# Patient Record
Sex: MALE | Race: WHITE | Employment: FULL TIME | URBAN - METROPOLITAN AREA
[De-identification: names, ages, dates, MRNs, and addresses within clinical notes are randomized per-mention and may not be internally consistent; named-entity substitution may affect disease eponyms.]

---

## 2020-07-06 ENCOUNTER — VIRTUAL VISIT (OUTPATIENT)
Dept: FAMILY MEDICINE | Facility: CLINIC | Age: 25
End: 2020-07-06
Payer: COMMERCIAL

## 2020-07-06 DIAGNOSIS — F41.1 GENERALIZED ANXIETY DISORDER: Primary | ICD-10-CM

## 2020-07-06 PROCEDURE — 99204 OFFICE O/P NEW MOD 45 MIN: CPT | Mod: 95 | Performed by: PHYSICIAN ASSISTANT

## 2020-07-06 RX ORDER — BUPROPION HYDROCHLORIDE 150 MG/1
150 TABLET ORAL EVERY MORNING
Qty: 60 TABLET | Refills: 1 | Status: SHIPPED | OUTPATIENT
Start: 2020-07-06 | End: 2020-07-24

## 2020-07-06 NOTE — PATIENT INSTRUCTIONS
Patient Education     Understanding Generalized Anxiety Disorder (KYUNG)    Anxiety can fill you with worry and fear. Sometimes anxiety is healthy. It alerts you to a potential threat and gets you to respond and take action. But for some people, anxiety gets so bad it causes problems in daily life. If you find yourself in a constant state of anxiety, you may have an anxiety disorder called generalized anxiety disorder (KYUNG). Speak with your healthcare provider or mental health professional to learn more. He or she can help.   What is generalized anxiety disorder?  KYUNG means that you are worrying constantly and can t control the worrying. Healthcare providers diagnose KYUNG when your worrying happens on most days and for at least 6 months.  With KYUNG, you might worry about money, your family and friends, work, or the world in general. You might not even be sure what you're anxious about. But whatever it is, you have an intense fear that the worst will happen. These feelings never really go away. In people age 65 and older, KYUNG is one of the most commonly diagnosed anxiety disorders.  Many times it occurs with depression. This constant worry affects your quality of life and makes it hard to function. KYUNG can cause physical symptoms, too.  What are common symptoms of generalized anxiety disorder?  People with KUYNG often think they have a physical illness. The disorder can cause symptoms, such as:    Excessive worry that interferes with daily activities and lasts for at least 6 months    Muscle tension, especially in the neck and shoulders    Nausea and stomach problems    Frequent headaches    Feeling lightheaded    Restlessness, trouble sleeping    Feeling irritable and on edge all the time  How can generalized anxiety disorder be treated?  KYUNG can be treated with medicine or therapy (also called counseling), or both. Medicine helps to reduce symptoms, so you can continue with your daily routine. Therapy helps you  understand the cause of your anxiety and learn how to manage it. Both forms of treatment help you deal with problems that anxiety causes in your life. This helps you to be healthier and happier.  Date Last Reviewed: 5/1/2017 2000-2019 [email or web address]. [address] [place] [address], [place] [address]. All rights reserved. This information is not intended as a substitute for professional medical care. Always follow your healthcare professional's instructions.

## 2020-07-06 NOTE — PROGRESS NOTES
"Kuldeep Deluca is a 24 year old male who is being evaluated via a billable video visit.      The patient has been notified of following:     \"This video visit will be conducted via a call between you and your physician/provider. We have found that certain health care needs can be provided without the need for an in-person physical exam.  This service lets us provide the care you need with a video conversation.  If a prescription is necessary we can send it directly to your pharmacy.  If lab work is needed we can place an order for that and you can then stop by our lab to have the test done at a later time.    Video visits are billed at different rates depending on your insurance coverage.  Please reach out to your insurance provider with any questions.    If during the course of the call the physician/provider feels a video visit is not appropriate, you will not be charged for this service.\"    Patient has given verbal consent for Video visit? Yes  How would you like to obtain your AVS? Mail a copy  Patient would like the video invitation sent by: Text to cell phone: [phone]  Will anyone else be joining your video visit? No  Subjective     Kuldeep Deluca is a 24 year old male who presents today via video visit for the following health issues:    HPI  Abnormal Mood Symptoms    Duration: 3-4 years     Description:  Depression: no  Anxiety: YES  Panic attacks: no      Accompanying signs and symptoms: see PHQ-9 and KYUNG scores    History (similar episodes/previous evaluation): None    Precipitating or alleviating factors: None    Therapies tried and outcome: none    Attention issues     Duration: 3-4 years     Description (location/character/radiation): Patient states that it was pointed out by a physiatrist he saw around the age of 16. Says that he didn't really start to notice it was a issue till 3-4 years ago      Intensity:  moderate    Accompanying signs and symptoms: Trouble focusing / completing tasks "     History (similar episodes/previous evaluation): None    Precipitating or alleviating factors: None    Therapies tried and outcome: None     Video Start Time: 8:06 AM    Patient Active Problem List   Diagnosis     Mild intermittent asthma     Obesity     Past Surgical History:   Procedure Laterality Date     HC CREATE EARDRUM OPENING,GEN ANESTH  1995    P.E. Tubes     HC REMOVE TONSILS/ADENOIDS,<11 Y/O  1998    T & A <10y.o.       Social History     Tobacco Use     Smoking status: Never Smoker     Smokeless tobacco: Never Used     Tobacco comment: no exposure   Substance Use Topics     Alcohol use: No     Family History   Problem Relation Age of Onset     Eye Disorder Mother      Genitourinary Problems Mother         kidney stones     Respiratory Mother         asthma     Diabetes Paternal Grandfather      Lipids Paternal Grandfather      Alcohol/Drug Paternal Grandfather         alcohol     Obesity Paternal Grandfather      Lipids Maternal Grandmother      Obesity Maternal Grandmother          Current Outpatient Medications   Medication Sig Dispense Refill     buPROPion (WELLBUTRIN XL) 150 MG 24 hr tablet Take 1 tablet (150 mg) by mouth every morning 60 tablet 1     IBUPROFEN 200 MG OR TABS 600 MG ORALLY EVERY 6 HOURS WITH FOOD       Allergies   Allergen Reactions     Sulfa Drugs      Reviewed and updated as needed this visit by Provider  Tobacco  Allergies  Meds  Problems  Med Hx  Surg Hx  Fam Hx       Review of Systems   Constitutional, HEENT, cardiovascular, pulmonary, gi and gu systems are negative, except as otherwise noted.      Objective       Physical Exam   GENERAL: Healthy, alert and no distress  EYES: Eyes grossly normal to inspection.  No discharge or erythema, or obvious scleral/conjunctival abnormalities.  RESP: No audible wheeze, cough, or visible cyanosis.  No visible retractions or increased work of breathing.    SKIN: Visible skin clear. No significant rash, abnormal pigmentation or  lesions.  NEURO: Cranial nerves grossly intact.  Mentation and speech appropriate for age.  PSYCH: Mentation appears normal, affect normal/bright, judgement and insight intact, normal speech and appearance well-groomed.    Diagnostic Test Results:  none       Assessment & Plan   (F41.1) Generalized anxiety disorder  (primary encounter diagnosis)  Comment: Pt with ongoing anxiety for many years. Has not been treated for this in the past. Also with inattentive symptoms. Discussed symptoms and they are daily, and somewhat unpredictable. Recommended daily preventative treatment for anxiety first. Once this is controlled better, we can tackle the inattention symptoms. Discussed medication side effects. Patient made the informed decision to start Wellbutrin. He will also go to therapy, referral placed. Follow-up in 3-4 weeks for reevaluation and medication titration.   Plan: buPROPion (WELLBUTRIN XL) 150 MG 24 hr tablet,         MENTAL HEALTH REFERRAL  - Adult; Outpatient         Treatment; Individual/Couples/Family/Group         Therapy/Health Psychology; [facility]: [facility] [phone]; We will         contact you to schedule the appointment or         please call with any questions    Return in about 3 weeks (around 7/27/2020) for Video Visit.    Dakota Molina PA-C  [facility]    Video-Visit Details    Type of service:  Video Visit    Video End Time:8:22 AM    Originating Location (pt. Location): Home    Distant Location (provider location):  [facility]     Platform used for Video Visit: Jovanny Molina PA-C

## 2020-07-24 ENCOUNTER — VIRTUAL VISIT (OUTPATIENT)
Dept: FAMILY MEDICINE | Facility: CLINIC | Age: 25
End: 2020-07-24
Payer: COMMERCIAL

## 2020-07-24 ENCOUNTER — VIRTUAL VISIT (OUTPATIENT)
Dept: BEHAVIORAL HEALTH | Facility: CLINIC | Age: 25
End: 2020-07-24
Attending: PHYSICIAN ASSISTANT

## 2020-07-24 DIAGNOSIS — F41.1 GENERALIZED ANXIETY DISORDER: Primary | ICD-10-CM

## 2020-07-24 DIAGNOSIS — F41.9 ANXIETY: Primary | ICD-10-CM

## 2020-07-24 PROCEDURE — 90834 PSYTX W PT 45 MINUTES: CPT | Mod: 95 | Performed by: MARRIAGE & FAMILY THERAPIST

## 2020-07-24 PROCEDURE — 99213 OFFICE O/P EST LOW 20 MIN: CPT | Mod: 95 | Performed by: PHYSICIAN ASSISTANT

## 2020-07-24 RX ORDER — BUPROPION HYDROCHLORIDE 150 MG/1
300 TABLET ORAL EVERY MORNING
Qty: 60 TABLET | Refills: 1 | Status: SHIPPED | OUTPATIENT
Start: 2020-07-24

## 2020-07-24 ASSESSMENT — COLUMBIA-SUICIDE SEVERITY RATING SCALE - C-SSRS
4. HAVE YOU HAD THESE THOUGHTS AND HAD SOME INTENTION OF ACTING ON THEM?: NO
ATTEMPT PAST THREE MONTHS: NO
4. HAVE YOU HAD THESE THOUGHTS AND HAD SOME INTENTION OF ACTING ON THEM?: NO
TOTAL  NUMBER OF ABORTED OR SELF INTERRUPTED ATTEMPTS PAST 3 MONTHS: NO
1. IN THE PAST MONTH, HAVE YOU WISHED YOU WERE DEAD OR WISHED YOU COULD GO TO SLEEP AND NOT WAKE UP?: NO
5. HAVE YOU STARTED TO WORK OUT OR WORKED OUT THE DETAILS OF HOW TO KILL YOURSELF? DO YOU INTEND TO CARRY OUT THIS PLAN?: NO
TOTAL  NUMBER OF ABORTED OR SELF INTERRUPTED ATTEMPTS PAST LIFETIME: NO
2. HAVE YOU ACTUALLY HAD ANY THOUGHTS OF KILLING YOURSELF?: NO
TOTAL  NUMBER OF INTERRUPTED ATTEMPTS LIFETIME: NO
TOTAL  NUMBER OF INTERRUPTED ATTEMPTS PAST 3 MONTHS: NO
2. HAVE YOU ACTUALLY HAD ANY THOUGHTS OF KILLING YOURSELF LIFETIME?: NO
ATTEMPT LIFETIME: NO
6. HAVE YOU EVER DONE ANYTHING, STARTED TO DO ANYTHING, OR PREPARED TO DO ANYTHING TO END YOUR LIFE?: NO
6. HAVE YOU EVER DONE ANYTHING, STARTED TO DO ANYTHING, OR PREPARED TO DO ANYTHING TO END YOUR LIFE?: NO
3. HAVE YOU BEEN THINKING ABOUT HOW YOU MIGHT KILL YOURSELF?: NO
5. HAVE YOU STARTED TO WORK OUT OR WORKED OUT THE DETAILS OF HOW TO KILL YOURSELF? DO YOU INTEND TO CARRY OUT THIS PLAN?: NO
1. IN THE PAST MONTH, HAVE YOU WISHED YOU WERE DEAD OR WISHED YOU COULD GO TO SLEEP AND NOT WAKE UP?: NO

## 2020-07-24 ASSESSMENT — ANXIETY QUESTIONNAIRES
1. FEELING NERVOUS, ANXIOUS, OR ON EDGE: MORE THAN HALF THE DAYS
5. BEING SO RESTLESS THAT IT IS HARD TO SIT STILL: NEARLY EVERY DAY
6. BECOMING EASILY ANNOYED OR IRRITABLE: SEVERAL DAYS
7. FEELING AFRAID AS IF SOMETHING AWFUL MIGHT HAPPEN: NOT AT ALL
2. NOT BEING ABLE TO STOP OR CONTROL WORRYING: SEVERAL DAYS
GAD7 TOTAL SCORE: 10
3. WORRYING TOO MUCH ABOUT DIFFERENT THINGS: MORE THAN HALF THE DAYS
IF YOU CHECKED OFF ANY PROBLEMS ON THIS QUESTIONNAIRE, HOW DIFFICULT HAVE THESE PROBLEMS MADE IT FOR YOU TO DO YOUR WORK, TAKE CARE OF THINGS AT HOME, OR GET ALONG WITH OTHER PEOPLE: SOMEWHAT DIFFICULT

## 2020-07-24 ASSESSMENT — PATIENT HEALTH QUESTIONNAIRE - PHQ9
SUM OF ALL RESPONSES TO PHQ QUESTIONS 1-9: 15
5. POOR APPETITE OR OVEREATING: SEVERAL DAYS

## 2020-07-24 NOTE — Clinical Note
Audie Duncan, I recently met with Kuldeep and wanted to let you see my note. He is really wondering about ADHD so I referred him for testing. He plans to meet with me again the same day he has his next follow up with you. Let me know if you have any other questions or concerns.   Thanks,   BETH Dumont, Behavioral Health Clinician

## 2020-07-24 NOTE — PROGRESS NOTES
"Kuldeep Deluca is a 24 year old male who is being evaluated via a billable video visit.      The patient has been notified of following:     \"This video visit will be conducted via a call between you and your physician/provider. We have found that certain health care needs can be provided without the need for an in-person physical exam.  This service lets us provide the care you need with a video conversation.  If a prescription is necessary we can send it directly to your pharmacy.  If lab work is needed we can place an order for that and you can then stop by our lab to have the test done at a later time.    Video visits are billed at different rates depending on your insurance coverage.  Please reach out to your insurance provider with any questions.    If during the course of the call the physician/provider feels a video visit is not appropriate, you will not be charged for this service.\"    Patient has given verbal consent for Video visit? Yes  How would you like to obtain your AVS? MyChart  If you are dropped from the video visit, the video invite should be resent to: Text to cell phone: [phone]  Will anyone else be joining your video visit? No    Subjective   Kuldeep Deluca is a 24 year old male who presents today via video visit for the following health issues:    HPI  Anxiety Follow-Up    How are you doing with your anxiety since your last visit? No change    Are you having other symptoms that might be associated with anxiety? No    Have you had a significant life event? Relationship Concerns     Are you feeling depressed? Yes:  feels lost     Do you have any concerns with your use of alcohol or other drugs? No     Patient denies any s/e at this time.     No SI, HI or self harm.     Social History     Tobacco Use     Smoking status: Never Smoker     Smokeless tobacco: Never Used     Tobacco comment: no exposure   Substance Use Topics     Alcohol use: No     Drug use: No       How many servings of " fruits and vegetables do you eat daily?  0-1    On average, how many sweetened beverages do you drink each day (Examples: soda, juice, sweet tea, etc.  Do NOT count diet or artificially sweetened beverages)?   0    How many days per week do you exercise enough to make your heart beat faster? 3 or less    How many minutes a day do you exercise enough to make your heart beat faster? 20 - 29  How many days per week do you miss taking your medication? 1    What makes it hard for you to take your medications?  remembering to take    Video Start Time: 8:30 AM    Patient Active Problem List   Diagnosis     Mild intermittent asthma     Obesity     Past Surgical History:   Procedure Laterality Date     HC CREATE EARDRUM OPENING,GEN ANESTH  1995    P.E. Tubes     HC REMOVE TONSILS/ADENOIDS,<13 Y/O  1998    T & A <10y.o.       Social History     Tobacco Use     Smoking status: Never Smoker     Smokeless tobacco: Never Used     Tobacco comment: no exposure   Substance Use Topics     Alcohol use: No     Family History   Problem Relation Age of Onset     Eye Disorder Mother      Genitourinary Problems Mother         kidney stones     Respiratory Mother         asthma     Diabetes Paternal Grandfather      Lipids Paternal Grandfather      Alcohol/Drug Paternal Grandfather         alcohol     Obesity Paternal Grandfather      Lipids Maternal Grandmother      Obesity Maternal Grandmother          Current Outpatient Medications   Medication Sig Dispense Refill     buPROPion (WELLBUTRIN XL) 150 MG 24 hr tablet Take 1 tablet (150 mg) by mouth every morning 60 tablet 1     IBUPROFEN 200 MG OR TABS 600 MG ORALLY EVERY 6 HOURS WITH FOOD       Allergies   Allergen Reactions     Sulfa Drugs        Reviewed and updated as needed this visit by Provider       Review of Systems   Constitutional, psych, neuro, cardiovascular, pulmonary, gi and gu systems are negative, except as otherwise noted.      Objective       Physical Exam   GENERAL:  Healthy, alert and no distress  EYES: Eyes grossly normal to inspection.  No discharge or erythema, or obvious scleral/conjunctival abnormalities.  RESP: No audible wheeze, cough, or visible cyanosis.  No visible retractions or increased work of breathing.    SKIN: Visible skin clear. No significant rash, abnormal pigmentation or lesions.  NEURO: Cranial nerves grossly intact.  Mentation and speech appropriate for age.  PSYCH: Mentation appears normal, affect normal/bright, judgement and insight intact, normal speech and appearance well-groomed.    Diagnostic Test Results:  none       Assessment & Plan   (F41.1) Generalized anxiety disorder  (primary encounter diagnosis)  Comment: No much change with 150 mg of Wellbutrin. No s/e however. Not a lot of change in his life since we last talked. Still with distractibility and difficulty getting tasks done. Will increase Wellbutrin to 300 mg daily and follow-up in 4 weeks for medication adjustments.    Plan: buPROPion (WELLBUTRIN XL) 150 MG 24 hr tablet          Return in about 4 weeks (around 8/21/2020) for Video Visit.    Dakota Molina PA-C  [facility]     Video-Visit Details    Type of service:  Video Visit    Video End Time:8:39AM    Originating Location (pt. Location): Home    Distant Location (provider location):  [facility]     Platform used for Video Visit: Jovanny Molina PA-C

## 2020-07-24 NOTE — PATIENT INSTRUCTIONS
Increase Wellbutrin to two tablets once per day.      You may have a few side effects with increasing the dose of the medication but these tend to go away after a few days.     If anything comes up in between the time we are next scheduled to meet, do not hesitate to contact me.

## 2020-07-24 NOTE — PROGRESS NOTES
"[facility] Primary Care: Integrated Behavioral Health  July 24, 2020    Kuldeep Deluca is a 24 year old male who is being evaluated via a telephone visit.      The patient has been notified of the following:     \"We have found that certain health care needs can be provided without the need for a face to face visit.  This service lets us provide the care you need with a short phone conversation.      I will have full access to your [facility] medical record during this entire phone call.   I will be taking notes for your medical record.     Since this is like an office visit, we will bill your insurance company for this service.  Please check with your medical insurance if this type of telephone visit/virtual care is covered.  You may be responsible for the cost of this service if insurance coverage is denied.      There are potential benefits and risks of telephone visits (e.g. limits to patient confidentiality) that differ from in-person visits.?  Confidentiality still applies for telephone services, and nobody will record the visit.  It is important to be in a quiet, private space that is free of distractions (including cell phone or other devices) during the visit.??     If during the course of the call I believe a telephone visit is not appropriate, you will not be charged for this service\"    Consent has been obtained for this service by care team member: yes.    Start time: 10:38    End time: 11:30      Behavioral Health Clinician Progress Note    Patient Name: Kuldeep Deluca           Service Type:  Individual      Session Length: 38 - 52      Attendees: Patient    Visit Activities (Refresh list every visit): [facility] and [facility] Only    Diagnostic Assessment Date: unable to complete due to patient concerns  Treatment Plan Review Date: due 3rd visit  See Flowsheets for today's PHQ-9 and KYUNG-7 results  Previous PHQ-9:   PHQ-9 SCORE 7/24/2020   PHQ-9 Total Score 15     Previous KYUNG-7:   KYUNG-7 SCORE " "7/24/2020   Total Score 10       MEJIA LEVEL:  No flowsheet data found.    DATA  Extended Session (60+ minutes): No  Interactive Complexity: No  Crisis: No  [facility] Patient: No    Treatment Objective(s) Addressed in This Session:  Target Behavior(s): disease management/lifestyle changes anxiety and attention issues    Depressed Mood: Increase interest, engagement, and pleasure in doing things  Decrease frequency and intensity of feeling down, depressed, hopeless  Feel less tired and more energy during the day   Identify negative self-talk and behaviors: challenge core beliefs, myths, and actions  Improve concentration, focus, and mindfulness in daily activities   Anxiety: will experience a reduction in anxiety, will develop more effective coping skills to manage anxiety symptoms, will develop healthy cognitive patterns and beliefs and will increase ability to function adaptively  Attention Problems: will develop coping skills to effectively manage attention issues    Current Stressors / Issues:  Patient was referred to this writer by his PCP. He has previously met with counselors and found it helpful. Patient states that he has had more concerns with attention lately. He states that it has \"always been this way\", however he hasn't addressed it before and is to the point that he is more concerned.  He identified that in late high school and early college is when he first noticed attention issues. He reports that he had difficulty completing his work and he would often jump from task to task. Further into adulthood he noticed difficulty completing tasks around the house. He notices this in occupation as well, as he doesn't believe he is as efficient as he could be. He reports feeling easily distracted, and describes his thought process as \"flipping through channels\" on a remote.   Patient reports feeling anxious and finds that he will procrastinate. He identified that he worries about the potential for things to happen. " "Patient denies having any panic attacks.   Patient identified current stressors of a recent break up with his girlfriend after three years of being together. He had bought a house and they both moved in, and then the relationship ended shortly thereafter. 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 He moved to [place] from [place]. He currently works out of [employer]. His girlfriend is in the process of moving out and still has a lot of her belongings at the house, which is bothersome to him.   He states that he will feel overwhelmed with a lot of things and will not do what he needs to do. He states that he is distracted by his phone.   He reports that he doesn't have a lot of friends, but is close with the friends he has. He states that he has good relationships with his family.   Patient reports having decreased appetite.  Patient enjoys cars and motorcycles and likes to work on them. He states that he also likes to write and that he collects typewriters. He also plays guitar and likes to listen to music.  He states that he is sleeping okay, but has been staying up later when he probably \"should be sleeping\".     Discussed anxiety and ADHD and how there are symptoms that can be difficult to differentiate at times. Patient states that he is interested in a neuropsych eval to rule out ADHD. Referrals were provided to patient. He is also open to counseling to work on coping and managing his symptoms.     Progress on Treatment Objective(s) / Homework:  In development-first visit    Motivational Interviewing    MI Intervention: Expressed Empathy/Understanding, Supported Autonomy, Collaboration, Evocation, Permission to raise concern or advise, Open-ended questions, Reflections: simple and complex, Change talk (evoked) and Reframe     Change Talk Expressed by the Patient: Desire to change Ability to change Reasons to change Need to change Committment to change " Activation Taking steps    Provider Response to Change Talk: E - Evoked more info from patient about behavior change, A - Affirmed patient's thoughts, decisions, or attempts at behavior change, R - Reflected patient's change talk and S - Summarized patient's change talk statements    Also provided psychoeducation about behavioral health condition, symptoms, and treatment options    Care Plan review completed: Yes    Medication Review:  Changes to psychiatric medications, see updated Medication List in EPIC.     Medication Compliance:  Yes he is being prescribed Wellbutrin and this was increased today Patient denies noticing any change at this point.    Changes in Health Issues:   None reported    Chemical Use Review:   Substance Use: Chemical use reviewed, no active concerns identified      Tobacco Use: No current tobacco use.      Assessment: Current Emotional / Mental Status (status of significant symptoms):  Risk status (Self / Other harm or suicidal ideation)  Patient denies a history of suicidal ideation, suicide attempts, self-injurious behavior, homicidal ideation, homicidal behavior and and other safety concerns  Patient denies current fears or concerns for personal safety.  Patient denies current or recent suicidal ideation or behaviors.  Patient denies current or recent homicidal ideation or behaviors.  Patient denies current or recent self injurious behavior or ideation.  Patient denies other safety concerns.  A safety and risk management plan has not been developed at this time, however patient was encouraged to call [place] [phone] should there be a change in any of these risk factors.    Appearance:   Appropriate   Eye Contact:   Fair   Psychomotor Behavior: Normal   Attitude:   Cooperative  Pleasant  Orientation:   All  Speech   Rate / Production: Normal    Volume:  Normal   Mood:    Anxious   Affect:    Appropriate   Thought Content:  Clear   Thought Form:  Coherent  Logical   Insight:    Good      Diagnoses:  1. Anxiety, unspecified        Collateral Reports Completed:  Routed note to PCP    Plan: (Homework, other):  Patient was given information about behavioral services and encouraged to schedule a follow up appointment with the clinic [facility] in 1 month, he will call sooner if needed.  He was also given information about mental health symptoms and treatment options .  CD Recommendations: No indications of CD issues. Patient was provided referrals to [facility] for ADHD testing.  BETH Dumont, [facility]

## 2020-07-25 ASSESSMENT — ANXIETY QUESTIONNAIRES: GAD7 TOTAL SCORE: 10

## 2020-11-16 ENCOUNTER — HEALTH MAINTENANCE LETTER (OUTPATIENT)
Age: 25
End: 2020-11-16

## 2020-12-29 ENCOUNTER — MYC MEDICAL ADVICE (OUTPATIENT)
Dept: FAMILY MEDICINE | Facility: CLINIC | Age: 25
End: 2020-12-29

## 2021-02-02 NOTE — TELEPHONE ENCOUNTER
Called patient and completed Asthma questions over the phone. Questions had been sent to patient on my chart but they did not respond.   Chika Wood, CMA

## 2021-02-03 ASSESSMENT — ASTHMA QUESTIONNAIRES: ACT_TOTALSCORE: 25

## 2021-09-18 ENCOUNTER — HEALTH MAINTENANCE LETTER (OUTPATIENT)
Age: 26
End: 2021-09-18

## 2022-01-08 ENCOUNTER — HEALTH MAINTENANCE LETTER (OUTPATIENT)
Age: 27
End: 2022-01-08

## 2022-11-19 ENCOUNTER — HEALTH MAINTENANCE LETTER (OUTPATIENT)
Age: 27
End: 2022-11-19

## 2023-04-09 ENCOUNTER — HEALTH MAINTENANCE LETTER (OUTPATIENT)
Age: 28
End: 2023-04-09